# Patient Record
Sex: MALE | Race: ASIAN | NOT HISPANIC OR LATINO | Employment: STUDENT | URBAN - METROPOLITAN AREA
[De-identification: names, ages, dates, MRNs, and addresses within clinical notes are randomized per-mention and may not be internally consistent; named-entity substitution may affect disease eponyms.]

---

## 2017-09-18 ENCOUNTER — NURSE TRIAGE (OUTPATIENT)
Dept: NURSING | Facility: CLINIC | Age: 22
End: 2017-09-18

## 2017-09-18 NOTE — TELEPHONE ENCOUNTER
Aretha states that lat week he had some really bad diarrhea for several dahs and took pepto bismol He says that now his stool is black. Advised that is one effect of pepto bismol.    Elsie Matute RN     [place] Nurse Advisor

## 2018-08-02 ENCOUNTER — RADIANT APPOINTMENT (OUTPATIENT)
Dept: GENERAL RADIOLOGY | Facility: CLINIC | Age: 23
End: 2018-08-02
Attending: INTERNAL MEDICINE
Payer: COMMERCIAL

## 2018-08-02 ENCOUNTER — OFFICE VISIT (OUTPATIENT)
Dept: FAMILY MEDICINE | Facility: CLINIC | Age: 23
End: 2018-08-02
Payer: COMMERCIAL

## 2018-08-02 VITALS
BODY MASS INDEX: 24.72 KG/M2 | OXYGEN SATURATION: 96 % | HEART RATE: 85 BPM | DIASTOLIC BLOOD PRESSURE: 89 MMHG | TEMPERATURE: 98.4 F | RESPIRATION RATE: 16 BRPM | SYSTOLIC BLOOD PRESSURE: 134 MMHG | WEIGHT: 176.6 LBS | HEIGHT: 71 IN

## 2018-08-02 DIAGNOSIS — R05.3 CHRONIC COUGH: ICD-10-CM

## 2018-08-02 DIAGNOSIS — R05.8 UPPER AIRWAY COUGH SYNDROME: ICD-10-CM

## 2018-08-02 DIAGNOSIS — J30.2 SEASONAL ALLERGIC RHINITIS, UNSPECIFIED CHRONICITY, UNSPECIFIED TRIGGER: ICD-10-CM

## 2018-08-02 DIAGNOSIS — Z13.1 SCREENING FOR DIABETES MELLITUS: ICD-10-CM

## 2018-08-02 DIAGNOSIS — L30.9 PERIANAL DERMATITIS: Primary | ICD-10-CM

## 2018-08-02 LAB — HBA1C MFR BLD: 4.7 % (ref 0–5.6)

## 2018-08-02 PROCEDURE — 99214 OFFICE O/P EST MOD 30 MIN: CPT | Performed by: INTERNAL MEDICINE

## 2018-08-02 PROCEDURE — 83036 HEMOGLOBIN GLYCOSYLATED A1C: CPT | Performed by: INTERNAL MEDICINE

## 2018-08-02 PROCEDURE — 36415 COLL VENOUS BLD VENIPUNCTURE: CPT | Performed by: INTERNAL MEDICINE

## 2018-08-02 PROCEDURE — 70220 X-RAY EXAM OF SINUSES: CPT | Mod: FY

## 2018-08-02 PROCEDURE — 71047 X-RAY EXAM CHEST 3 VIEWS: CPT | Mod: FY

## 2018-08-02 RX ORDER — MOMETASONE FUROATE MONOHYDRATE 50 UG/1
2 SPRAY, METERED NASAL DAILY
Qty: 17 G | Refills: 11 | Status: SHIPPED | OUTPATIENT
Start: 2018-08-02

## 2018-08-02 RX ORDER — TRIAMCINOLONE ACETONIDE 1 MG/G
CREAM TOPICAL
Qty: 453.6 G | Refills: 5 | Status: SHIPPED | OUTPATIENT
Start: 2018-08-02

## 2018-08-02 ASSESSMENT — PAIN SCALES - GENERAL: PAINLEVEL: NO PAIN (1)

## 2018-08-02 NOTE — PROGRESS NOTES
SUBJECTIVE:   Juwan Early is a 23 year old male who presents to clinic today for the following health issues:    1) Skin concerns  Onset: since 14 years old    Description: Constant itching in the perineum.    Progression of Symptoms:  Slightly better.    Accompanying Signs & Symptoms: Scaly, reddish appearance.    Previous history of similar problem:   None prior to age 14.    Precipitating factors:   Worsened by: itching. Denies any history of diabetes.    Alleviating factors:  Improved by: nothing really helps    Therapies Tried and outcome: Ant-i itch cream, anti spray,  Dry crack lotoins    2) Chronic cough  Onset: 2-3 years    Fever: no    Chills/Sweats: no    Headache (location?): no    Sinus Pressure:no    Conjunctivitis:  no    Ear Pain: no    Rhinorrhea: YES    Congestion: YES    Sore Throat: no      Cough: YES-non-productive, productive of clear sputum, productive of green sputum    Wheeze: no     Decreased Appetite: no     Nausea: no    Vomiting: no    Diarrhea:  no    Dysuria/Freq.: YES    Fatigue/Achiness: no     Sick/Strep Exposure: no      Therapies Tried and outcome: Cough drops and mucinex (not effective). Cough is worse when supine.       Problem list and histories reviewed & adjusted, as indicated.  Additional history: as documented    Patient Active Problem List   Diagnosis     Upper airway cough syndrome     History reviewed. No pertinent surgical history.    Social History   Substance Use Topics     Smoking status: Current Every Day Smoker     Smokeless tobacco: Never Used     Alcohol use Yes      Comment: once a week     History reviewed. No pertinent family history.      No Known Allergies  No lab results found.   BP Readings from Last 3 Encounters:   08/02/18 134/89   04/23/15 129/81   09/13/14 133/78    Wt Readings from Last 3 Encounters:   08/02/18 176 lb 9.6 oz (80.1 kg)   04/23/15 149 lb (67.6 kg)   09/13/14 149 lb 9.6 oz (67.9 kg)               ROS:  CONSTITUTIONAL: NEGATIVE for  "fever, chills, change in weight  INTEGUMENTARY/SKIN: NEGATIVE for worrisome rashes, moles or lesions  EYES: NEGATIVE for vision changes or irritation  ENT/MOUTH: NEGATIVE for ear, mouth and throat problems  RESP: NEGATIVE for significant cough or SOB  CV: NEGATIVE for chest pain, palpitations or peripheral edema  GI: NEGATIVE for nausea, abdominal pain, heartburn, or change in bowel habits  : NEGATIVE for frequency, dysuria, or hematuria  MUSCULOSKELETAL: NEGATIVE for significant arthralgias or myalgia  NEURO: NEGATIVE for weakness, dizziness or paresthesias  ENDOCRINE: NEGATIVE for temperature intolerance, skin/hair changes  HEME: NEGATIVE for bleeding problems  PSYCHIATRIC: NEGATIVE for changes in mood or affect    OBJECTIVE:     /89 (BP Location: Left arm, Patient Position: Chair, Cuff Size: Adult Regular)  Pulse 85  Temp 98.4  F (36.9  C) (Oral)  Resp 16  Ht 5' 11\" (1.803 m)  Wt 176 lb 9.6 oz (80.1 kg)  SpO2 96%  BMI 24.63 kg/m2  Body mass index is 24.63 kg/(m^2).  GENERAL: healthy, alert and no distress  EYES: Eyes grossly normal to inspection, PERRL and conjunctivae and sclerae normal  HENT: ear canals and TM's normal, nose and mouth without ulcers or lesions  NECK: no adenopathy, no asymmetry, masses, or scars and thyroid normal to palpation  RESP: lungs clear to auscultation - no rales, rhonchi or wheezes  CV: regular rate and rhythm, normal S1 S2, no S3 or S4, no murmur, click or rub, no peripheral edema and peripheral pulses strong  ABDOMEN: soft, nontender, no hepatosplenomegaly, no masses and bowel sounds normal  MS: no gross musculoskeletal defects noted, no edema  SKIN: no suspicious lesions or rashes  NEURO: Normal strength and tone, mentation intact and speech normal  PSYCH: mentation appears normal, affect normal/bright    Diagnostic Test Results:  Results for orders placed or performed in visit on 08/02/18   XR Sinus Complete G/E 3 Views    Narrative    XR SINUS COMPLETE G/E 3 VW " 8/2/2018 9:20 AM    HISTORY: Cough.    COMPARISON: None.    FINDINGS: Sinuses and mastoid air cells are clear.      Impression    IMPRESSION: No evidence of sinus disease.    CHINTAN DUGGAN MD   XR Chest 3 Views w Apical Lordotic    Narrative    XR CHEST 3 VIEWS WITH APICAL LORDOTIC 8/2/2018 9:20 AM    HISTORY: Cough.    COMPARISON: None.    FINDINGS: No airspace consolidation, pleural effusion or pneumothorax.  Normal heart size.      Impression    IMPRESSION: No acute cardiopulmonary abnormality.    CHINTAN DUGGAN MD   Hemoglobin A1c   Result Value Ref Range    Hemoglobin A1C 4.7 0 - 5.6 %       ASSESSMENT/PLAN:     (L30.9) Perianal dermatitis  (primary encounter diagnosis)  Comment:   Plan: DERMATOLOGY REFERRAL, triamcinolone (KENALOG)         0.1 % cream            (R05) Upper airway cough syndrome  Comment:   Plan: XR Sinus Complete G/E 3 Views, mometasone         (NASONEX) 50 MCG/ACT spray, OTOLARYNGOLOGY         REFERRAL            (R05) Chronic cough  Comment:   Plan: XR Chest 3 Views w Apical Lordotic            (Z13.1) Screening for diabetes mellitus  Comment:   Plan: Hemoglobin A1c, CANCELED: Hemoglobin A1c            (J30.2) Seasonal allergic rhinitis, unspecified chronicity, unspecified trigger  Comment:   Plan: ALLERGY/ASTHMA ADULT REFERRAL            Follow-up visit if condition worsens.      Tom Thurman MD  [facility]

## 2018-08-02 NOTE — LETTER
August 2, 2018      Juwan Early  [address]  [place] [id number]        Mr. Early,                    During your recent clinic visit, you were screened for diabetes mellitus to determine whether your perianal concerns are also due to a fungal infection triggered by untreated diabetes. Your test turned out to be normal (no diabetes). For any questions, you may call my office at [phone].    Sincerely,       Tom Thurman MD/[facility]  Internal Medicine    Resulted Orders   Hemoglobin A1c   Result Value Ref Range    Hemoglobin A1C 4.7 0 - 5.6 %      Comment:      Normal <5.7% Prediabetes 5.7-6.4%  Diabetes 6.5% or higher - adopted from ADA   consensus guidelines.

## 2018-08-02 NOTE — MR AVS SNAPSHOT
After Visit Summary   8/2/2018    Juwan Early    MRN: [id number]           Patient Information     Date Of Birth          1995        Visit Information        Provider Department      8/2/2018 8:20 AM Tom Thurman MD [facility]        Today's Diagnoses     Perianal dermatitis    -  1    Upper airway cough syndrome        Chronic cough        Screening for diabetes mellitus        Seasonal allergic rhinitis, unspecified chronicity, unspecified trigger           Follow-ups after your visit        Additional Services     ALLERGY/ASTHMA ADULT REFERRAL       Your provider has referred you to: [facility]: [facility] [phone]  [email or web address]    Please be aware that coverage of these services is subject to the terms and limitations of your health insurance plan.  Call member services at your health plan with any benefit or coverage questions.      Please bring the following with you to your appointment:    (1) Any X-Rays, CTs or MRIs which have been performed.  Contact the facility where they were done to arrange for  prior to your scheduled appointment.    (2) List of current medications  (3) This referral request   (4) Any documents/labs given to you for this referral            DERMATOLOGY REFERRAL       Your provider has referred you to: [facility]: [facility] [phone]   [email or web address]    Please be aware that coverage of these services is subject to the terms and limitations of your health insurance plan.  Call member services at your health plan with any benefit or coverage questions.      Please bring the following with you to your appointment:    (1) Any X-Rays, CTs or MRIs which have been performed.  Contact the facility where they were done to arrange for  prior to your scheduled appointment.    (2) List of current  medications  (3) This referral request   (4) Any documents/labs given to you for this referral            OTOLARYNGOLOGY REFERRAL       Your provider has referred you to: [facility]: [facility] [place] [phone]   [email or web address]    Please be aware that coverage of these services is subject to the terms and limitations of your health insurance plan.  Call member services at your health plan with any benefit or coverage questions.      Please bring the following with you to your appointment:    (1) Any X-Rays, CTs or MRIs which have been performed.  Contact the facility where they were done to arrange for  prior to your scheduled appointment.   (2) List of current medications  (3) This referral request   (4) Any documents/labs given to you for this referral                  Future tests that were ordered for you today     Open Future Orders        Priority Expected Expires Ordered    DERMATOLOGY REFERRAL Routine  8/2/2019 8/2/2018            Who to contact     If you have questions or need follow up information about today's clinic visit or your schedule please contact [facility] directly at [phone].  Normal or non-critical lab and imaging results will be communicated to you by MyChart, letter or phone within 4 business days after the clinic has received the results. If you do not hear from us within 7 days, please contact the clinic through MyChart or phone. If you have a critical or abnormal lab result, we will notify you by phone as soon as possible.  Submit refill requests through [employer] or call your pharmacy and they will forward the refill request to us. Please allow 3 business days for your refill to be completed.          Additional Information About Your Visit        MyChart Information     [employer] lets you send messages to your doctor, view your test results, renew your prescriptions, schedule appointments and more. To  "sign up, go to [email or web address] . Click on \"Log in\" on the left side of the screen, which will take you to the Welcome page. Then click on \"Sign up Now\" on the right side of the page.     You will be asked to enter the access code listed below, as well as some personal information. Please follow the directions to create your username and password.     Your access code is: [email or web address]  Expires: 10/31/2018  9:42 AM     Your access code will  in 90 days. If you need help or a new code, please call your [place] clinic or [phone].        Care EveryWhere ID     This is your Care EveryWhere ID. This could be used by other organizations to access your [place] medical records  [id number]        Your Vitals Were     Pulse Temperature Respirations Height Pulse Oximetry BMI (Body Mass Index)    85 98.4  F (36.9  C) (Oral) 16 5' 11\" (1.803 m) 96% 24.63 kg/m2       Blood Pressure from Last 3 Encounters:   18 134/89   04/23/15 129/81   14 133/78    Weight from Last 3 Encounters:   18 176 lb 9.6 oz (80.1 kg)   04/23/15 149 lb (67.6 kg)   14 149 lb 9.6 oz (67.9 kg)              We Performed the Following     ALLERGY/ASTHMA ADULT REFERRAL     Hemoglobin A1c     OTOLARYNGOLOGY REFERRAL     XR Chest 3 Views w Apical Lordotic     XR Sinus Complete G/E 3 Views          Today's Medication Changes          These changes are accurate as of 18  9:43 AM.  If you have any questions, ask your nurse or doctor.               Start taking these medicines.        Dose/Directions    mometasone 50 MCG/ACT spray   Commonly known as:  NASONEX   Used for:  Upper airway cough syndrome   Started by:  Tom Thurman MD        Dose:  2 spray   Spray 2 sprays into both nostrils daily   Quantity:  17 g   Refills:  11       triamcinolone 0.1 % cream   Commonly known as:  KENALOG   Used for:  Perianal dermatitis   Started by:  Tom Thurman MD        Apply sparingly to affected area twice times " daily.   Quantity:  453.6 g   Refills:  5            Where to get your medicines      These medications were sent to [place] [facility] [id number] [facility] [place], [place] - [address]  [address] [address]     Phone:  [phone]     mometasone 50 MCG/ACT spray    triamcinolone 0.1 % cream                Primary Care Provider Fax #    Physician No Ref-Primary [phone]       No address on file        Equal Access to Services     KATY PARSON : Hadii aad ku hadasho Soomaali, waaxda luqadaha, qaybta kaalmada adeegyada, waxay idiin hayaan adeeg kharaidalmis lamichaelle . So [facility] [phone].    ATENCIÓN: Si habla español, tiene a harman disposición servicios gratuitos de asistencia lingüística. [facility] [phone].    We comply with applicable federal civil rights laws and [place] laws. We do not discriminate on the basis of race, color, national origin, age, disability, sex, sexual orientation, or gender identity.            Thank you!     Thank you for choosing [facility]  for your care. Our goal is always to provide you with excellent care. Hearing back from our patients is one way we can continue to improve our services. Please take a few minutes to complete the written survey that you may receive in the mail after your visit with us. Thank you!             Your Updated Medication List - Protect others around you: Learn how to safely use, store and throw away your medicines at www.disposemymeds.org.          This list is accurate as of 8/2/18  9:43 AM.  Always use your most recent med list.                   Brand Name Dispense Instructions for use Diagnosis    ibuprofen 800 MG tablet    ADVIL/MOTRIN    30 tablet    Take 1 tablet (800 mg) by mouth every 8 hours as needed for moderate pain    Acute sinus infection, Fever       mometasone 50 MCG/ACT spray    NASONEX    17 g    Spray 2 sprays into both nostrils daily    Upper airway cough syndrome       NYQUIL PO      None Entered    Strep  throat, URI (upper respiratory infection)       triamcinolone 0.1 % cream    KENALOG    453.6 g    Apply sparingly to affected area twice times daily.    Perianal dermatitis

## 2024-06-30 ENCOUNTER — HEALTH MAINTENANCE LETTER (OUTPATIENT)
Age: 29
End: 2024-06-30

## 2024-11-11 ENCOUNTER — OFFICE VISIT (OUTPATIENT)
Dept: FAMILY MEDICINE | Facility: CLINIC | Age: 29
End: 2024-11-11
Payer: COMMERCIAL

## 2024-11-11 VITALS
WEIGHT: 148.4 LBS | RESPIRATION RATE: 15 BRPM | HEIGHT: 71 IN | SYSTOLIC BLOOD PRESSURE: 146 MMHG | OXYGEN SATURATION: 100 % | TEMPERATURE: 98.3 F | HEART RATE: 76 BPM | DIASTOLIC BLOOD PRESSURE: 97 MMHG | BODY MASS INDEX: 20.77 KG/M2

## 2024-11-11 DIAGNOSIS — J30.89 PERENNIAL ALLERGIC RHINITIS: Primary | ICD-10-CM

## 2024-11-11 DIAGNOSIS — Z11.3 SCREEN FOR STD (SEXUALLY TRANSMITTED DISEASE): ICD-10-CM

## 2024-11-11 DIAGNOSIS — J33.9 NASAL POLYPOSIS: Primary | ICD-10-CM

## 2024-11-11 DIAGNOSIS — N36.8 URETHRAL CYST: ICD-10-CM

## 2024-11-11 DIAGNOSIS — J30.89 PERENNIAL ALLERGIC RHINITIS: ICD-10-CM

## 2024-11-11 DIAGNOSIS — J33.9 NASAL POLYPOSIS: ICD-10-CM

## 2024-11-11 LAB — HIV 1+2 AB+HIV1 P24 AG SERPL QL IA: NONREACTIVE

## 2024-11-11 PROCEDURE — 87389 HIV-1 AG W/HIV-1&-2 AB AG IA: CPT | Performed by: INTERNAL MEDICINE

## 2024-11-11 PROCEDURE — 87491 CHLMYD TRACH DNA AMP PROBE: CPT | Performed by: INTERNAL MEDICINE

## 2024-11-11 PROCEDURE — 86695 HERPES SIMPLEX TYPE 1 TEST: CPT | Performed by: INTERNAL MEDICINE

## 2024-11-11 PROCEDURE — 86780 TREPONEMA PALLIDUM: CPT | Performed by: INTERNAL MEDICINE

## 2024-11-11 PROCEDURE — 36415 COLL VENOUS BLD VENIPUNCTURE: CPT | Performed by: INTERNAL MEDICINE

## 2024-11-11 PROCEDURE — G2211 COMPLEX E/M VISIT ADD ON: HCPCS | Performed by: INTERNAL MEDICINE

## 2024-11-11 PROCEDURE — 86696 HERPES SIMPLEX TYPE 2 TEST: CPT | Performed by: INTERNAL MEDICINE

## 2024-11-11 PROCEDURE — 87591 N.GONORRHOEAE DNA AMP PROB: CPT | Performed by: INTERNAL MEDICINE

## 2024-11-11 PROCEDURE — 99203 OFFICE O/P NEW LOW 30 MIN: CPT | Performed by: INTERNAL MEDICINE

## 2024-11-11 RX ORDER — METHYLPREDNISOLONE 4 MG/1
TABLET ORAL
Qty: 21 TABLET | Refills: 2 | Status: SHIPPED | OUTPATIENT
Start: 2024-11-12

## 2024-11-11 RX ORDER — MONTELUKAST SODIUM 10 MG/1
10 TABLET ORAL AT BEDTIME
Qty: 30 TABLET | Refills: 11 | Status: SHIPPED | OUTPATIENT
Start: 2024-11-11

## 2024-11-11 NOTE — PROGRESS NOTES
Acute Illness  Acute illness concerns: Nasal Congestion   Onset/Duration: 1 year   Symptoms:  Fever: No  Chills/Sweats: No  Headache (location?): No  Sinus Pressure: No  Conjunctivitis:  No  Ear Pain: no  Rhinorrhea: YES  Congestion: YES  Sore Throat: No  Cough: no  Wheeze: No  Decreased Appetite: No  Nausea: No  Vomiting: No  Diarrhea: No  Dysuria/Freq.: No  Dysuria or Hematuria: No  Fatigue/Achiness: No  Sick/Strep Exposure: No  Therapies tried and outcome: Prescribed Nasonex six years ago by this provider (not effective).    Wake up with severe congestion, feels like a hangover. Constant runny nose and sniffing. Clear and thin drainage that happens constantly. Told to have a polyp many years ago. Lived with cats. Has air and water humidifier. No meds used. Had allergy test at [facility] in [facility] ([place] Recommended to have lab drawn at [facility] in [place], [place]. Has to carry Kleenex since it happens perennially and even at different temperatures (warm or cold season).      Concern - Cyst on genitals   Onset: 6 months   Description: cyst on genitals, started off as discoloration, turned into cyst  Intensity: mild  Progression of Symptoms:  worsening  Accompanying Signs & Symptoms: n/a  Previous history of similar problem: n/a  Precipitating factors:        Worsened by: n/a  Alleviating factors:        Improved by: n/a  Therapies tried and outcome: None   "discoloration, turned into cyst  Intensity: mild  Progression of Symptoms:  worsening  Accompanying Signs & Symptoms: n/a  Previous history of similar problem: n/a  Precipitating factors:        Worsened by: n/a  Alleviating factors:        Improved by: n/a  Therapies tried and outcome: None           Significant Problems:     Patient Active Problem List   Diagnosis    Upper airway cough syndrome              Review of Systems:     CONSTITUTIONAL: NEGATIVE for fever, chills, change in weight  INTEGUMENTARY/SKIN: NEGATIVE for worrisome rashes, moles or lesions  EYES: NEGATIVE for vision changes or irritation  ENT/MOUTH: NEGATIVE for epistaxis and vertigo  RESP: NEGATIVE for significant cough or SOB  BREAST: NEGATIVE for masses, tenderness or discharge  CV: NEGATIVE for chest pain, palpitations or peripheral edema  GI: NEGATIVE for nausea, abdominal pain, heartburn, or change in bowel habits  : NEGATIVE for frequency, dysuria, or hematuria  MUSCULOSKELETAL: NEGATIVE for significant arthralgias or myalgia  NEURO: NEGATIVE for weakness, dizziness or paresthesias  ENDOCRINE: NEGATIVE for temperature intolerance, skin/hair changes  HEME: NEGATIVE for bleeding problems  PSYCHIATRIC: NEGATIVE for changes in mood or affect             Physical Exam:   BP (!) 146/97 (BP Location: Right arm, Patient Position: Sitting, Cuff Size: Adult Regular)   Pulse 76   Temp 98.3  F (36.8  C) (Temporal)   Resp 15   Ht 1.803 m (5' 11\")   Wt 67.3 kg (148 lb 6.4 oz)   SpO2 100%   BMI 20.70 kg/m     Constitutional:   fatigued, alert, cooperative, no apparent distress, and appears stated age     Eyes:   extra-ocular muscles intact     ENT:   normocephalic, without obvious abnormality     Lungs:   no increased work of breathing and no retractions and clear to auscultation.     Cardiovascular:   No examined.     Genitounirinary:   Urethral cyst noted.     Neurologic:   Mental Status Exam:  Level of Alertness:   awake  Memory:   " normal  Fund of Knowledge:  normal  Attention/Concentration:  normal  Language:  normal     Neuropsychiatric:   Affect: normal             Data:   Epic reviewed.     Disposition:  Follow-up in 4 weeks or as needed.      Tom Thurman MD  Internal Medicine  [facility] Team

## 2024-11-12 ENCOUNTER — MYC MEDICAL ADVICE (OUTPATIENT)
Dept: FAMILY MEDICINE | Facility: CLINIC | Age: 29
End: 2024-11-12
Payer: COMMERCIAL

## 2024-11-12 ENCOUNTER — TELEPHONE (OUTPATIENT)
Dept: FAMILY MEDICINE | Facility: CLINIC | Age: 29
End: 2024-11-12
Payer: COMMERCIAL

## 2024-11-12 LAB
C TRACH DNA SPEC QL NAA+PROBE: NEGATIVE
HSV1 IGG SERPL QL IA: 20.6 INDEX
HSV1 IGG SERPL QL IA: ABNORMAL
HSV2 IGG SERPL QL IA: 0.1 INDEX
HSV2 IGG SERPL QL IA: ABNORMAL
N GONORRHOEA DNA SPEC QL NAA+PROBE: NEGATIVE
T PALLIDUM AB SER QL: NONREACTIVE

## 2024-11-12 NOTE — TELEPHONE ENCOUNTER
Patient calling regarding lab results. Patient seen in my chart his positive herpes lab. He is wondering what the positive lab means and possible treatment.   Advised patient to allow time for provider to review labs.     Routing to provider to review/advise.       Ave MOREIRAN,  RN  [facility]

## 2024-11-12 NOTE — TELEPHONE ENCOUNTER
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 Routing to provider to review and advise.     Mary Robin, HARISHN, RN   [facility]

## 2024-11-19 ENCOUNTER — TELEPHONE (OUTPATIENT)
Dept: UROLOGY | Facility: CLINIC | Age: 29
End: 2024-11-19
Payer: COMMERCIAL

## 2024-11-19 NOTE — TELEPHONE ENCOUNTER
Patient Details  Patient's Full Name  Juwan Early  Patient's Date of Birth  1995  Patient's Phone Number  [phone]  Patient's Email ID  [email or web address]  Has the patient visited [facility] in the past 3 years?  Yes  Address Details  Address  [address] [place] [id number]  Appointment Preferences  Reason for appointment  Whitening coloration on tip of genitals.  Preferred Provider  Yong Simmons  Preferred Location  [place]  Preferred Visit Type  In-person   Services   Do you need an  for your appointment?  No  Billing Preference  Which billing situation applies to the patient?  Patient has insurance  Insurance Information  Insurance Provider Name  [employer]  Member ID/ Policy Number  [id number]  Group Number  [id number]  Insurance Contact for Provider  [phone]  Policy Chin  Is the patient the Insurance Policy chin/subscriber?  Yes, patient is the policy chin  Callback Preferences  Could you share your preferred callback time with us?  No, I don't have a preference          Reason for request is not in protocols. Routing to clinic to triage and assist patient.

## 2025-05-27 DIAGNOSIS — N48.9 PENILE LESION: ICD-10-CM

## 2025-05-27 RX ORDER — HYDROCORTISONE VALERATE 2 MG/G
OINTMENT TOPICAL 2 TIMES DAILY
Qty: 45 G | Refills: 3 | Status: SHIPPED | OUTPATIENT
Start: 2025-05-27

## 2025-05-27 NOTE — CONFIDENTIAL NOTE
Refill prescription approved per [facility] Refill Protocol. Last OV= 01/2025.    Pending Prescriptions:                       Disp   Refills    hydrocortisone valerate (WEST-ERASMO) 0.2 %*45 g   3            Sig: Apply topically 2 times daily.      Shanon DAS RN Urology 5/27/2025 7:56 AM

## 2025-07-13 ENCOUNTER — HEALTH MAINTENANCE LETTER (OUTPATIENT)
Age: 30
End: 2025-07-13